# Patient Record
Sex: MALE | Race: OTHER | NOT HISPANIC OR LATINO | URBAN - METROPOLITAN AREA
[De-identification: names, ages, dates, MRNs, and addresses within clinical notes are randomized per-mention and may not be internally consistent; named-entity substitution may affect disease eponyms.]

---

## 2019-08-23 ENCOUNTER — EMERGENCY (EMERGENCY)
Facility: HOSPITAL | Age: 24
LOS: 1 days | Discharge: HOME | End: 2019-08-23
Attending: EMERGENCY MEDICINE | Admitting: EMERGENCY MEDICINE
Payer: MEDICAID

## 2019-08-23 VITALS
OXYGEN SATURATION: 97 % | DIASTOLIC BLOOD PRESSURE: 73 MMHG | SYSTOLIC BLOOD PRESSURE: 115 MMHG | HEART RATE: 73 BPM | TEMPERATURE: 98 F | RESPIRATION RATE: 18 BRPM

## 2019-08-23 PROCEDURE — 99283 EMERGENCY DEPT VISIT LOW MDM: CPT

## 2019-08-23 NOTE — ED ADULT TRIAGE NOTE - CHIEF COMPLAINT QUOTE
Pt c/o left sided abdominal pain that radiates to the belly button x1 week. c/o nausea. Pt states, "I had this pain a month ago and I passed out from it. I am suppose to get an ultrasound done but haven't gone yet." Denies fever/chills/vomiting. Denies PMH.

## 2019-08-24 NOTE — ED PROVIDER NOTE - PROGRESS NOTE DETAILS
PRICE:  While awaiting blood work, patient stated to RN and this provider that he wished to be discharged.  States he does not want to wait and is going to see his PCP tomorrow Dr. Gonzalez.  Patient appeared well, steady gait, not altered or intoxicated.  Risks of undiagnosed abdominal pathology explained to patient including worsening pain, permanent disability, coma, death and he understood risks and continued to want discharge.  Signed discharge paperwork prior to leaving ED.

## 2019-08-24 NOTE — ED PROVIDER NOTE - OBJECTIVE STATEMENT
22 yo M with no pmhx here c/o left sided upper abdominal burning pain that radiates to the belly button x1 week with nausea. The pt had bloodwork performed with his pmd all of which were negative. The pt states, "I had this pain a month ago and I passed out from it,' in the triage note, however, upon further questioning, he is a  who had not had a meal from 6am to 4pm and described a vasovagal episode. The pt is also scheduled for an abdominal ultrasound but says that he has not gone yet. The pt is not having pain at this time. Denies fever/chills/vomiting, dysuria, testicular pain.   abdominal pain

## 2019-08-24 NOTE — ED PROVIDER NOTE - NSFOLLOWUPINSTRUCTIONS_ED_ALL_ED_FT
1.  You are leaving the ED without lab work for your abdominal pain.   2.  We do not know what was causing your abdominal pain today.   3.   Please return to care for worsening pain, fevers, chills, nausea, vomiting, bloody or dark stools.

## 2019-08-24 NOTE — ED PROVIDER NOTE - CLINICAL SUMMARY MEDICAL DECISION MAKING FREE TEXT BOX
Pt is not currently having an abdominal pain and has a benign exam. He is being followed by his pmd who has scheduled an abdominal ultrasound. Will order labs and recommend that the pt follow with GI as an outpt.